# Patient Record
Sex: FEMALE | Race: WHITE | Employment: OTHER
[De-identification: names, ages, dates, MRNs, and addresses within clinical notes are randomized per-mention and may not be internally consistent; named-entity substitution may affect disease eponyms.]

---

## 2017-01-05 ENCOUNTER — SURGERY (OUTPATIENT)
Age: 82
End: 2017-01-05

## 2017-01-11 ENCOUNTER — OFFICE VISIT (OUTPATIENT)
Dept: SURGERY | Facility: CLINIC | Age: 82
End: 2017-01-11

## 2017-01-11 VITALS
WEIGHT: 174 LBS | SYSTOLIC BLOOD PRESSURE: 157 MMHG | HEIGHT: 67 IN | BODY MASS INDEX: 27.31 KG/M2 | TEMPERATURE: 98 F | DIASTOLIC BLOOD PRESSURE: 71 MMHG | HEART RATE: 55 BPM

## 2017-01-11 DIAGNOSIS — C43.62 MELANOMA OF LEFT UPPER ARM (HCC): Primary | ICD-10-CM

## 2017-01-11 PROCEDURE — 99024 POSTOP FOLLOW-UP VISIT: CPT | Performed by: SURGERY

## 2017-01-11 RX ORDER — PEN NEEDLE, DIABETIC 32GX 5/32"
NEEDLE, DISPOSABLE MISCELLANEOUS
Refills: 1 | COMMUNITY
Start: 2017-01-06

## 2017-01-11 NOTE — PROGRESS NOTES
[facility] Surgical Oncology    Patient Name:  Derrick Ann   YOB: 1926   Gender:  Female   Appt Date:  1/11/2017   Provider:  Anastasia Trinidad MD   Insurance:  [employer] [facility] •  indapamide 1.25 MG Oral Tab, Take 1.25 mg by mouth every morning., Disp: , Rfl:   •  triamcinolone acetonide 0.1 % External Cream, Apply topically nightly., Disp: , Rfl:   •  Potassium Chloride ER 20 MEQ Oral Tab CR, Take 20 mEq by mouth Noon., Disp: , • Cholecystectomy     • Colonoscopy  2003     Screening: normal   • Colonoscopy  4/9/09  [place]     Iron def anemia: 2 cecal AVMs tx'd with BICAP; diverticulosis; int hemorrhoids   • Knee replacement surgery       Right   • Other surgical history       Rig T: [phone]  F: [phone]

## 2019-06-12 PROBLEM — E11.42 DIABETIC PERIPHERAL NEUROPATHY (HCC): Status: ACTIVE | Noted: 2017-11-22

## 2019-06-12 PROBLEM — I45.2 RIGHT BUNDLE BRANCH BLOCK (RBBB) WITH LEFT ANTERIOR HEMIBLOCK: Status: ACTIVE | Noted: 2019-04-12

## 2019-06-12 PROBLEM — H40.9 GLAUCOMA: Status: ACTIVE | Noted: 2019-06-12

## 2019-06-12 PROBLEM — Z79.4 CONTROLLED TYPE 2 DIABETES MELLITUS WITHOUT COMPLICATION, WITH LONG-TERM CURRENT USE OF INSULIN (HCC): Status: ACTIVE | Noted: 2017-07-31

## 2019-06-12 PROBLEM — E11.9 CONTROLLED TYPE 2 DIABETES MELLITUS WITHOUT COMPLICATION, WITH LONG-TERM CURRENT USE OF INSULIN (HCC): Status: ACTIVE | Noted: 2017-07-31

## 2019-06-12 PROBLEM — I67.89 CEREBRAL MICROVASCULAR DISEASE: Status: ACTIVE | Noted: 2017-11-22

## 2019-06-12 PROBLEM — N28.9 RENAL INSUFFICIENCY SYNDROME: Status: ACTIVE | Noted: 2017-11-03

## (undated) NOTE — MR AVS SNAPSHOT
[facility]  [address] [facility], [address] [place] [address]                    After Visit Summary   1/11/2017    Adriana Sam    MRN: [id number]           Visit Information        Provider Department Dept Phone    1/11 Multiple Vitamins-Minerals (TAB-A-SEJAL MAXIMUM) Oral Tab Take 1 tablet by mouth daily. Acidophilus/Pectin Oral Cap Take 1 capsule by mouth 2 (two) times daily with meals. ROPINIRole HCl 5 MG Oral Tab Take 5 mg by mouth nightly.     ROPINIRole HCl 2 M Support Staff. Remember, MyChart is NOT to be used for urgent needs. For medical emergencies, dial 911.